# Patient Record
Sex: MALE | NOT HISPANIC OR LATINO | URBAN - METROPOLITAN AREA
[De-identification: names, ages, dates, MRNs, and addresses within clinical notes are randomized per-mention and may not be internally consistent; named-entity substitution may affect disease eponyms.]

---

## 2017-05-30 ENCOUNTER — FOLLOW UP ESTABLISHED (OUTPATIENT)
Dept: URBAN - METROPOLITAN AREA CLINIC 44 | Facility: CLINIC | Age: 72
End: 2017-05-30
Payer: COMMERCIAL

## 2017-05-30 DIAGNOSIS — Z79.84 LONG TERM (CURRENT) USE OF ORAL ANTIDIABETIC DRUGS: ICD-10-CM

## 2017-05-30 DIAGNOSIS — H25.813 COMBINED FORMS OF AGE-RELATED CATARACT, BILATERAL: ICD-10-CM

## 2017-05-30 PROCEDURE — 92014 COMPRE OPH EXAM EST PT 1/>: CPT | Performed by: OPTOMETRIST

## 2017-05-30 PROCEDURE — 92134 CPTRZ OPH DX IMG PST SGM RTA: CPT | Performed by: OPTOMETRIST

## 2017-05-30 ASSESSMENT — INTRAOCULAR PRESSURE
OS: 14
OD: 14

## 2017-05-30 ASSESSMENT — VISUAL ACUITY
OD: 20/20
OS: 20/80

## 2017-05-30 ASSESSMENT — KERATOMETRY
OS: 46.25
OD: 46.13

## 2017-06-19 ENCOUNTER — Encounter (OUTPATIENT)
Dept: URBAN - METROPOLITAN AREA CLINIC 44 | Facility: CLINIC | Age: 72
End: 2017-06-19
Payer: COMMERCIAL

## 2017-06-19 DIAGNOSIS — Z01.818 ENCOUNTER FOR OTHER PREPROCEDURAL EXAMINATION: Primary | ICD-10-CM

## 2017-06-19 PROCEDURE — 99213 OFFICE O/P EST LOW 20 MIN: CPT | Performed by: PHYSICIAN ASSISTANT

## 2017-06-19 RX ORDER — METFORMIN HYDROCHLORIDE 1000 MG/1
TABLET, FILM COATED ORAL
Qty: 0 | Refills: 0 | Status: ACTIVE
Start: 2017-06-19

## 2017-06-19 RX ORDER — ATORVASTATIN CALCIUM 40 MG/1
40 MG TABLET, FILM COATED ORAL
Qty: 0 | Refills: 0 | Status: ACTIVE
Start: 2017-06-19

## 2017-06-19 RX ORDER — INSULIN LISPRO 100 [IU]/ML
INJECTION, SUSPENSION SUBCUTANEOUS
Qty: 0 | Refills: 0 | Status: INACTIVE
Start: 2017-06-19 | End: 2017-06-19

## 2017-06-19 RX ORDER — OXYCODONE AND ACETAMINOPHEN 10; 325 MG/1; MG/1
TABLET ORAL
Qty: 0 | Refills: 0 | Status: ACTIVE
Start: 2017-06-19

## 2017-06-19 RX ORDER — AMLODIPINE AND BENAZEPRIL HYDROCHLORIDE 10; 40 MG/1; MG/1
CAPSULE ORAL
Qty: 0 | Refills: 0 | Status: ACTIVE
Start: 2017-06-19

## 2017-06-19 RX ORDER — FLUOXETINE HYDROCHLORIDE 20 MG/1
20 MG CAPSULE ORAL
Qty: 0 | Refills: 0 | Status: ACTIVE
Start: 2017-06-19

## 2017-06-19 RX ORDER — CARISOPRODOL 350 MG/1
350 MG TABLET ORAL
Qty: 0 | Refills: 0 | Status: ACTIVE
Start: 2017-06-19

## 2017-06-22 ENCOUNTER — FOLLOW UP ESTABLISHED (OUTPATIENT)
Dept: URBAN - METROPOLITAN AREA CLINIC 44 | Facility: CLINIC | Age: 72
End: 2017-06-22
Payer: COMMERCIAL

## 2017-06-22 DIAGNOSIS — H35.371 PUCKERING OF MACULA, RIGHT EYE: Primary | ICD-10-CM

## 2017-06-22 DIAGNOSIS — E11.9 TYPE 2 DIABETES MELLITUS WITHOUT COMPLICATIONS: ICD-10-CM

## 2017-06-22 DIAGNOSIS — E11.65 DIABETES MELLITUS TYPE 2 WITH UNCONTROLLED BLOOD S: ICD-10-CM

## 2017-06-22 DIAGNOSIS — H25.12 AGE-RELATED NUCLEAR CATARACT, LEFT EYE: ICD-10-CM

## 2017-06-22 DIAGNOSIS — H25.11 AGE-RELATED NUCLEAR CATARACT, RIGHT EYE: ICD-10-CM

## 2017-06-22 PROCEDURE — 92012 INTRM OPH EXAM EST PATIENT: CPT | Performed by: OPHTHALMOLOGY

## 2017-06-22 RX ORDER — PREDNISOLONE ACETATE 10 MG/ML
1 % SUSPENSION/ DROPS OPHTHALMIC
Qty: 1 | Refills: 0 | Status: INACTIVE
Start: 2017-06-22 | End: 2019-12-20

## 2017-06-22 RX ORDER — DICLOFENAC SODIUM 1 MG/ML
0.1 % SOLUTION/ DROPS OPHTHALMIC
Qty: 1 | Refills: 0 | Status: INACTIVE
Start: 2017-06-22 | End: 2019-12-20

## 2017-06-22 ASSESSMENT — INTRAOCULAR PRESSURE
OD: 14
OS: 15

## 2017-07-13 ENCOUNTER — SURGERY (OUTPATIENT)
Dept: URBAN - METROPOLITAN AREA SURGERY 19 | Facility: SURGERY | Age: 72
End: 2017-07-13
Payer: COMMERCIAL

## 2017-07-13 PROCEDURE — 66984 XCAPSL CTRC RMVL W/O ECP: CPT | Performed by: OPHTHALMOLOGY

## 2017-07-17 ENCOUNTER — POST OP (OUTPATIENT)
Dept: URBAN - METROPOLITAN AREA CLINIC 44 | Facility: CLINIC | Age: 72
End: 2017-07-17

## 2017-07-17 DIAGNOSIS — Z96.1 PRESENCE OF INTRAOCULAR LENS: Primary | ICD-10-CM

## 2017-07-17 PROCEDURE — 99024 POSTOP FOLLOW-UP VISIT: CPT | Performed by: OPTOMETRIST

## 2017-07-17 ASSESSMENT — INTRAOCULAR PRESSURE: OS: 18

## 2019-12-05 ENCOUNTER — OFFICE VISIT (OUTPATIENT)
Dept: URBAN - METROPOLITAN AREA CLINIC 11 | Facility: CLINIC | Age: 74
End: 2019-12-05
Payer: MEDICARE

## 2019-12-05 DIAGNOSIS — H25.811 COMBINED FORMS OF AGE-RELATED CATARACT, RIGHT EYE: ICD-10-CM

## 2019-12-05 PROCEDURE — 99204 OFFICE O/P NEW MOD 45 MIN: CPT | Performed by: OPTOMETRIST

## 2019-12-05 ASSESSMENT — INTRAOCULAR PRESSURE
OD: 14
OS: 13

## 2019-12-05 NOTE — IMPRESSION/PLAN
Impression: Type 2 diabetes mellitus w/o complication: B23.7. Plan: No signs of retinopathy or neovascularization noted. Discussed ocular and systemic benefits of blood sugar control.  RTC 1yr complete exam

## 2019-12-05 NOTE — IMPRESSION/PLAN
Impression: Combined forms of age-related cataract, right eye: H25.811. Plan: Recommend cataract surgery OD.  Refer to MD for Cat paris castellano. No

## 2019-12-17 ENCOUNTER — PRE-OPERATIVE VISIT (OUTPATIENT)
Dept: URBAN - METROPOLITAN AREA CLINIC 11 | Facility: CLINIC | Age: 74
End: 2019-12-17
Payer: MEDICARE

## 2019-12-17 ASSESSMENT — PACHYMETRY
OS: 4.14
OD: 22.92
OD: 3.20
OS: 22.98

## 2019-12-20 ENCOUNTER — OFFICE VISIT (OUTPATIENT)
Dept: URBAN - METROPOLITAN AREA CLINIC 11 | Facility: CLINIC | Age: 74
End: 2019-12-20
Payer: MEDICARE

## 2019-12-20 PROCEDURE — 92004 COMPRE OPH EXAM NEW PT 1/>: CPT | Performed by: OPHTHALMOLOGY

## 2019-12-20 PROCEDURE — 92136 OPHTHALMIC BIOMETRY: CPT | Performed by: OPHTHALMOLOGY

## 2019-12-20 RX ORDER — LOTEPREDNOL ETABONATE 3.8 MG/G
0.38 % GEL OPHTHALMIC
Qty: 1 | Refills: 2 | Status: ACTIVE
Start: 2019-12-20

## 2019-12-20 RX ORDER — OFLOXACIN 3 MG/ML
0.3 % SOLUTION/ DROPS OPHTHALMIC
Qty: 5 | Refills: 1 | Status: ACTIVE
Start: 2019-12-20

## 2019-12-20 ASSESSMENT — INTRAOCULAR PRESSURE
OS: 17
OD: 16

## 2019-12-20 ASSESSMENT — VISUAL ACUITY
OS: 20/25
OD: 20/40

## 2019-12-20 NOTE — IMPRESSION/PLAN
Impression: Age-related nuclear cataract, right eye: H25.11. Plan: OK for ce/iol OD in [place] [place] 27, RL2. Distance target. Discussed ORA. Discussed lens options, Toric/Trifocal. Pt is a candidate for premium lens. Discussed need for glasses for near vision with standard IOL and possibly Trifocal as well. Discussed diagnosis of cataracts. Cataracts are limiting vision. Discussed risks, benefits and alternatives to surgery including but not limited to: bleeding, infection, risk of vision loss, loss of the eye, need for other surgery. Patient voiced understanding and wishes to proceed.

## 2019-12-31 ENCOUNTER — SURGERY (OUTPATIENT)
Dept: URBAN - METROPOLITAN AREA SURGERY 20 | Facility: SURGERY | Age: 74
End: 2019-12-31
Payer: MEDICARE

## 2019-12-31 PROCEDURE — 66984 XCAPSL CTRC RMVL W/O ECP: CPT | Performed by: OPHTHALMOLOGY

## 2020-01-01 ENCOUNTER — POST-OPERATIVE VISIT (OUTPATIENT)
Dept: URBAN - METROPOLITAN AREA CLINIC 45 | Facility: CLINIC | Age: 75
End: 2020-01-01

## 2020-01-01 PROCEDURE — 99024 POSTOP FOLLOW-UP VISIT: CPT | Performed by: OPTOMETRIST

## 2020-01-01 ASSESSMENT — INTRAOCULAR PRESSURE
OS: 15
OD: 17

## 2020-01-21 ENCOUNTER — POST-OPERATIVE VISIT (OUTPATIENT)
Dept: URBAN - METROPOLITAN AREA CLINIC 11 | Facility: CLINIC | Age: 75
End: 2020-01-21

## 2020-01-21 DIAGNOSIS — Z09 ENCNTR FOR F/U EXAM AFT TRTMT FOR COND OTH THAN MALIG NEOPLM: Primary | ICD-10-CM

## 2020-01-21 PROCEDURE — 99024 POSTOP FOLLOW-UP VISIT: CPT | Performed by: OPTOMETRIST

## 2020-01-21 ASSESSMENT — INTRAOCULAR PRESSURE
OS: 14
OD: 13

## 2020-01-21 ASSESSMENT — VISUAL ACUITY: OD: 20/40+1

## 2020-08-03 ENCOUNTER — POST-OPERATIVE VISIT (OUTPATIENT)
Dept: URBAN - METROPOLITAN AREA CLINIC 11 | Facility: CLINIC | Age: 75
End: 2020-08-03
Payer: MEDICARE

## 2020-08-03 DIAGNOSIS — H04.123 DRY EYE SYNDROME OF BILATERAL LACRIMAL GLANDS: Primary | ICD-10-CM

## 2020-08-03 PROCEDURE — 92012 INTRM OPH EXAM EST PATIENT: CPT | Performed by: OPTOMETRIST

## 2020-08-03 ASSESSMENT — INTRAOCULAR PRESSURE
OD: 13
OS: 13

## 2020-08-03 ASSESSMENT — VISUAL ACUITY
OD: 20/25
OS: 20/25